# Patient Record
Sex: MALE | Race: WHITE | Employment: OTHER | URBAN - METROPOLITAN AREA
[De-identification: names, ages, dates, MRNs, and addresses within clinical notes are randomized per-mention and may not be internally consistent; named-entity substitution may affect disease eponyms.]

---

## 2017-04-04 ENCOUNTER — TELEPHONE (OUTPATIENT)
Dept: FAMILY MEDICINE CLINIC | Facility: CLINIC | Age: 67
End: 2017-04-04

## 2017-04-04 DIAGNOSIS — E78.2 MIXED HYPERLIPIDEMIA: Primary | ICD-10-CM

## 2017-04-04 RX ORDER — ZOLPIDEM TARTRATE 10 MG/1
10 TABLET ORAL NIGHTLY PRN
Qty: 90 TABLET | Refills: 0 | Status: SHIPPED
Start: 2017-04-04 | End: 2017-04-29

## 2017-04-29 ENCOUNTER — OFFICE VISIT (OUTPATIENT)
Dept: FAMILY MEDICINE CLINIC | Facility: CLINIC | Age: 67
End: 2017-04-29

## 2017-04-29 VITALS
OXYGEN SATURATION: 97 % | TEMPERATURE: 98 F | HEART RATE: 75 BPM | BODY MASS INDEX: 25.16 KG/M2 | DIASTOLIC BLOOD PRESSURE: 82 MMHG | WEIGHT: 166 LBS | SYSTOLIC BLOOD PRESSURE: 144 MMHG | HEIGHT: 68 IN

## 2017-04-29 DIAGNOSIS — Z00.00 ROUTINE MEDICAL EXAM: Primary | ICD-10-CM

## 2017-04-29 DIAGNOSIS — E78.2 MIXED HYPERLIPIDEMIA: ICD-10-CM

## 2017-04-29 DIAGNOSIS — G25.81 RESTLESS LEG SYNDROME: ICD-10-CM

## 2017-04-29 DIAGNOSIS — R05.9 COUGH: ICD-10-CM

## 2017-04-29 DIAGNOSIS — R42 DIZZINESS: ICD-10-CM

## 2017-04-29 DIAGNOSIS — R73.9 HYPERGLYCEMIA: ICD-10-CM

## 2017-04-29 DIAGNOSIS — I10 ESSENTIAL HYPERTENSION: ICD-10-CM

## 2017-04-29 DIAGNOSIS — G47.00 INSOMNIA, UNSPECIFIED TYPE: ICD-10-CM

## 2017-04-29 PROCEDURE — 99397 PER PM REEVAL EST PAT 65+ YR: CPT | Performed by: FAMILY MEDICINE

## 2017-04-29 PROCEDURE — 99213 OFFICE O/P EST LOW 20 MIN: CPT | Performed by: FAMILY MEDICINE

## 2017-04-29 RX ORDER — ALBUTEROL SULFATE 90 UG/1
2 AEROSOL, METERED RESPIRATORY (INHALATION) EVERY 6 HOURS PRN
COMMUNITY
End: 2017-05-26 | Stop reason: ALTCHOICE

## 2017-04-29 RX ORDER — AMLODIPINE BESYLATE 2.5 MG/1
2.5 TABLET ORAL DAILY
Qty: 90 TABLET | Refills: 0 | Status: SHIPPED | OUTPATIENT
Start: 2017-04-29 | End: 2017-05-26

## 2017-04-29 RX ORDER — ZOLPIDEM TARTRATE 10 MG/1
10 TABLET ORAL NIGHTLY PRN
Qty: 90 TABLET | Refills: 1 | Status: SHIPPED
Start: 2017-04-29 | End: 2017-09-13

## 2017-04-29 RX ORDER — CYCLOBENZAPRINE HCL 10 MG
10 TABLET ORAL NIGHTLY
Qty: 90 TABLET | Refills: 1 | Status: SHIPPED
Start: 2017-04-29 | End: 2017-05-02

## 2017-04-29 RX ORDER — CYCLOBENZAPRINE HCL 10 MG
10 TABLET ORAL NIGHTLY
COMMUNITY
End: 2017-04-29

## 2017-04-30 NOTE — H&P
HPI:   James Moise is a 77year old male who presents for a complete physical exam.    Patient presents with:  Medication Follow-Up  Physical  Hyperlipidemia  Hypertension  Insomnia  Cough  Dizziness    Patient has new complaints as below:  Recheck AM   K 4.5 06/24/2014 05:29 AM    06/24/2014 05:29 AM   CO2 26.0 06/24/2014 05:29 AM   CREATSERUM 0.86 06/24/2014 05:29 AM   CA 8.9 06/24/2014 05:29 AM   ALB 4.6 04/28/2010 08:41 AM   TP 6.6 04/28/2010 08:41 AM   ALKPHO 76 04/28/2010 08:41 AM   AST 2 1,000 mg by mouth daily.  Disp:  Rfl:       Past Medical History   Diagnosis Date   • HYPERLIPIDEMIA    • SEASONAL ALLERGIES    • OTHER DISEASES 1994, 2007, 2009     bell's palsy x3   • Other and unspecified hyperlipidemia    • High cholesterol    • Back pr palpitations  LUNG: Chronic cough, no SOB, or wheeze  GI:  No abdominal pain.   No N/V/D/C  :  No dysuria  MS:  No joint pain or swelling, leg pain intermittently, back pain improved  NEURO:  Denies numbness or tingling  PSYCH:  No mood concerns or anxiet indicates understanding of these issues and recommendations and agrees to the plan.     Additional Assessment:  (R73.9) Hyperglycemia  (primary encounter diagnosis), glucose 4/2017 was 100  Plan: HEMOGLOBIN A1C        Mild/persistent, with normal hemoglobin nightly. -     Zolpidem Tartrate 10 MG Oral Tab; Take 1 tablet (10 mg total) by mouth nightly as needed for Sleep. -     AmLODIPine Besylate 2.5 MG Oral Tab; Take 1 tablet (2.5 mg total) by mouth daily.            Orders Placed This Encounter  Hemoglobin

## 2017-05-02 ENCOUNTER — MED REC SCAN ONLY (OUTPATIENT)
Dept: FAMILY MEDICINE CLINIC | Facility: CLINIC | Age: 67
End: 2017-05-02

## 2017-05-02 ENCOUNTER — TELEPHONE (OUTPATIENT)
Dept: FAMILY MEDICINE CLINIC | Facility: CLINIC | Age: 67
End: 2017-05-02

## 2017-05-02 RX ORDER — CYCLOBENZAPRINE HCL 10 MG
10 TABLET ORAL NIGHTLY
Qty: 90 TABLET | Refills: 1 | Status: SHIPPED
Start: 2017-05-02 | End: 2017-05-26

## 2017-05-02 RX ORDER — CYCLOBENZAPRINE HCL 10 MG
10 TABLET ORAL NIGHTLY
Qty: 90 TABLET | Refills: 1 | Status: SHIPPED
Start: 2017-05-02 | End: 2017-05-02

## 2017-05-12 ENCOUNTER — TELEPHONE (OUTPATIENT)
Dept: FAMILY MEDICINE CLINIC | Facility: CLINIC | Age: 67
End: 2017-05-12

## 2017-05-12 NOTE — TELEPHONE ENCOUNTER
Adelfo Hamilton,    Below are the results of your recently performed labs/tests: All results are within NORMAL limits EXCEPT:     Your HgbA1c (diabetes test) is/are slightly elevated. Watch your diet-lower your starches.     Please recheck as below

## 2017-05-26 ENCOUNTER — OFFICE VISIT (OUTPATIENT)
Dept: FAMILY MEDICINE CLINIC | Facility: CLINIC | Age: 67
End: 2017-05-26

## 2017-05-26 VITALS
RESPIRATION RATE: 18 BRPM | OXYGEN SATURATION: 95 % | DIASTOLIC BLOOD PRESSURE: 84 MMHG | BODY MASS INDEX: 25.07 KG/M2 | SYSTOLIC BLOOD PRESSURE: 112 MMHG | HEIGHT: 68 IN | WEIGHT: 165.38 LBS | HEART RATE: 83 BPM

## 2017-05-26 DIAGNOSIS — R73.9 HYPERGLYCEMIA: ICD-10-CM

## 2017-05-26 DIAGNOSIS — I10 ESSENTIAL HYPERTENSION: Primary | ICD-10-CM

## 2017-05-26 DIAGNOSIS — E78.2 MIXED HYPERLIPIDEMIA: ICD-10-CM

## 2017-05-26 PROCEDURE — 99213 OFFICE O/P EST LOW 20 MIN: CPT | Performed by: FAMILY MEDICINE

## 2017-05-26 RX ORDER — AMLODIPINE BESYLATE 2.5 MG/1
2.5 TABLET ORAL DAILY
Qty: 90 TABLET | Refills: 0 | Status: SHIPPED | OUTPATIENT
Start: 2017-05-26 | End: 2017-06-28 | Stop reason: SINTOL

## 2017-05-26 RX ORDER — CYCLOBENZAPRINE HCL 10 MG
10 TABLET ORAL NIGHTLY
Qty: 90 TABLET | Refills: 1 | Status: SHIPPED
Start: 2017-05-26 | End: 2018-04-16

## 2017-05-26 RX ORDER — CYCLOBENZAPRINE HCL 10 MG
10 TABLET ORAL NIGHTLY
Qty: 90 TABLET | Refills: 1 | Status: SHIPPED
Start: 2017-05-26 | End: 2017-05-26

## 2017-05-26 RX ORDER — EPINASTINE HCL 0.05 %
DROPS OPHTHALMIC (EYE)
Refills: 2 | COMMUNITY
Start: 2017-03-28

## 2017-05-26 NOTE — PROGRESS NOTES
HPI:   Patient presents with: Follow - Up: Blood pressure and blood sugar  Medication Request  HTN      PatientElizabeth Fischer is a 77year old male who presents for recheck of his hypertension.     Home Blood Pressure monitoring within the normal range Rfl:      No current facility-administered medications on file prior to visit.    Past Medical History   Diagnosis Date   • HYPERLIPIDEMIA    • SEASONAL ALLERGIES    • OTHER DISEASES 1994, 2007, 2009     bell's palsy x3   • Other and unspecified hyperlipide 112/84 mmHg  Pulse 83  Resp 18  Ht 68\"  Wt 165 lb 6.4 oz  BMI 25.15 kg/m2  SpO2 95% Body mass index is 25.15 kg/(m^2).   Wt Readings from Last 3 Encounters:  05/26/17 : 165 lb 6.4 oz  04/29/17 : 166 lb  03/16/15 : 158 lb    BP Readings from Last 3 Encounte

## 2017-05-26 NOTE — PROGRESS NOTES
HPI:   Patient presents with: Follow - Up: Blood pressure and blood sugar  Medication Request  HTN      Patient: Oly Giraldo is a 77year old male who presents for recheck of his hypertension.     Home Blood Pressure monitoring within the normal ran facility-administered medications on file prior to visit.    Past Medical History   Diagnosis Date   • HYPERLIPIDEMIA    • SEASONAL ALLERGIES    • OTHER DISEASES 1994, 2007, 2009     bell's palsy x3   • Other and unspecified hyperlipidemia    • High cholest Resp 18  Ht 68\"  Wt 165 lb 6.4 oz  BMI 25.15 kg/m2  SpO2 95% Body mass index is 25.15 kg/(m^2).   Wt Readings from Last 3 Encounters:  05/26/17 : 165 lb 6.4 oz  04/29/17 : 166 lb  03/16/15 : 158 lb    BP Readings from Last 3 Encounters:  05/26/17 : Tc Dooley exercise (3 times a week for 30+ minutes each time)  Refer to Ophthalmology annually for routine eye exam  Observe for now, rechk CMP and Hgba1c in 6 mo, f/u P then     - Hemoglobin A1C (Glycohemoglobin) [E]; Future    3.  Mixed hyperlipidemia  Stable, CPM,

## 2017-05-31 ENCOUNTER — PATIENT MESSAGE (OUTPATIENT)
Dept: FAMILY MEDICINE CLINIC | Facility: CLINIC | Age: 67
End: 2017-05-31

## 2017-06-02 RX ORDER — LISINOPRIL 5 MG/1
5 TABLET ORAL DAILY
Qty: 90 TABLET | Refills: 0 | Status: SHIPPED | OUTPATIENT
Start: 2017-06-02 | End: 2017-06-26

## 2017-06-02 NOTE — TELEPHONE ENCOUNTER
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 ... the only thing that has changed is the new BP medi

## 2017-06-28 RX ORDER — LISINOPRIL 5 MG/1
5 TABLET ORAL DAILY
Qty: 90 TABLET | Refills: 0 | Status: SHIPPED
Start: 2017-06-28 | End: 2017-11-24

## 2017-08-07 ENCOUNTER — TELEPHONE (OUTPATIENT)
Dept: FAMILY MEDICINE CLINIC | Facility: CLINIC | Age: 67
End: 2017-08-07

## 2017-08-07 DIAGNOSIS — E78.2 MIXED HYPERLIPIDEMIA: Primary | ICD-10-CM

## 2017-08-07 DIAGNOSIS — R73.9 HYPERGLYCEMIA: ICD-10-CM

## 2017-08-19 ENCOUNTER — OFFICE VISIT (OUTPATIENT)
Dept: FAMILY MEDICINE CLINIC | Facility: CLINIC | Age: 67
End: 2017-08-19

## 2017-08-19 VITALS
HEART RATE: 76 BPM | WEIGHT: 160 LBS | BODY MASS INDEX: 25.11 KG/M2 | HEIGHT: 66.8 IN | SYSTOLIC BLOOD PRESSURE: 124 MMHG | OXYGEN SATURATION: 98 % | TEMPERATURE: 98 F | DIASTOLIC BLOOD PRESSURE: 76 MMHG

## 2017-08-19 DIAGNOSIS — G25.81 RESTLESS LEG: ICD-10-CM

## 2017-08-19 DIAGNOSIS — I10 ESSENTIAL HYPERTENSION: ICD-10-CM

## 2017-08-19 DIAGNOSIS — E78.2 MIXED HYPERLIPIDEMIA: ICD-10-CM

## 2017-08-19 DIAGNOSIS — Z00.00 ROUTINE MEDICAL EXAM: Primary | ICD-10-CM

## 2017-08-19 DIAGNOSIS — R73.9 HYPERGLYCEMIA: ICD-10-CM

## 2017-08-19 PROCEDURE — 99397 PER PM REEVAL EST PAT 65+ YR: CPT | Performed by: FAMILY MEDICINE

## 2017-08-19 RX ORDER — DIAZEPAM 2 MG/1
TABLET ORAL
Qty: 45 TABLET | Refills: 0 | Status: SHIPPED
Start: 2017-08-19 | End: 2018-05-02 | Stop reason: ALTCHOICE

## 2017-08-19 NOTE — H&P
HPI:   Patient presents with:  Physical      Tiffanie Phlegm is a 79year old male who presents for a complete physical exam.     Patient has new complaints of:  · No new concerns, does still complain of restless leg which is painful, actually Bilateral needed for Sleep. Disp: 90 tablet Rfl: 1   Atorvastatin Calcium (LIPITOR) 10 MG Oral Tab  Disp:  Rfl:    Cetirizine HCl (ZYRTEC OR) Take 10 mg by mouth daily. Disp:  Rfl:    Ascorbic Acid (VITAMIN C) 1000 MG Oral Tab Take 1,000 mg by mouth daily.  Disp:  Rf Comment: 1-2 glasses of wine per week    Drug use: No                  REVIEW OF SYSTEMS:   Pertinent positives and negatives noted in the the HPI.  Specifically:  GEN:  No fever or fatigue  HEAD:  No headaches  EYES:  No vision change  EARS:  No hearing lo calculated from the following:    Height as of this encounter: 66.8\". Weight as of this encounter: 160 lb. .  Discussed importance of exercise and healthy well balanced diet. Recommend low fat DASH diet and aerobic exercise 30 minutes 3-4 times weekly. Consults:  None    Marina Salazar MD

## 2017-08-19 NOTE — H&P
HPI:   Patient presents with:  Physical      Rodriguezmalik Dixon is a 79year old male who presents for a complete physical exam.     Patient has new complaints of:  · ***    He denies CP, SOB, Dizziness, HAs, Palpitations, Weight loss/gain, Rectal Bleeding by mouth daily. Disp:  Rfl:    Ascorbic Acid (VITAMIN C) 1000 MG Oral Tab Take 1,000 mg by mouth daily. Disp:  Rfl:    Calcium Carbonate (CALCIUM 600 OR) Take 600 mg by mouth daily. Disp:  Rfl:    L-Lysine 1000 MG Oral Tab Take 1,000 mg by mouth daily.  Dis Specifically:  GEN:  No fever or fatigue  HEAD:  No headaches  EYES:  No vision change  EARS:  No hearing loss  MOUTH/THROAT:  No sore throat or dental problems  HEART:  No chest pain or palpitations  LUNG:  No SOB, cough or wheeze  GI:  No abdominal pain. and healthy well balanced diet. Recommend low fat DASH diet and aerobic exercise 30 minutes 3-4 times weekly.     Health maintenance:  · Fasting Lipids, CMP, PSA and CBC annually: ***   · At 49 y/o: screening Colonoscopy: q 10 years if WNL or as recommended

## 2017-09-22 ENCOUNTER — TELEPHONE (OUTPATIENT)
Dept: FAMILY MEDICINE CLINIC | Facility: CLINIC | Age: 67
End: 2017-09-22

## 2017-09-22 NOTE — TELEPHONE ENCOUNTER
Received fax from  [employer] [address]    for prior authorization for Zolpidem 10 mg tablets.   Sig: Take 1 tablet PO QHS PRN for sleep  Qty: 90    Spoke to representative at [facility] prior authorization ([phone]), medication

## 2017-11-27 RX ORDER — LISINOPRIL 5 MG/1
5 TABLET ORAL DAILY
Qty: 90 TABLET | Refills: 0 | Status: SHIPPED | OUTPATIENT
Start: 2017-11-27 | End: 2018-02-24

## 2017-11-27 NOTE — TELEPHONE ENCOUNTER
[facility]  [address]  [place], [place]  Requests refill for:    Lisinopril 5 mg oral tab  Sig: Take 1 tablet PO QD  Qty: 90  RF: 0    LOV 8/19/17; f/p due 2/2018

## 2017-12-04 ENCOUNTER — TELEPHONE (OUTPATIENT)
Dept: FAMILY MEDICINE CLINIC | Facility: CLINIC | Age: 67
End: 2017-12-04

## 2017-12-04 NOTE — TELEPHONE ENCOUNTER
Returning call after pt called answering service. Pt unavailable; spoke to wife, states she believes pt spoke to a physician who gave recommendation of OTC medications. Wife states pt is feeling \"a little\" better, afebrile.  Was concerned due to leaving

## 2017-12-19 ENCOUNTER — TELEPHONE (OUTPATIENT)
Dept: FAMILY MEDICINE CLINIC | Facility: CLINIC | Age: 67
End: 2017-12-19

## 2017-12-19 RX ORDER — AZITHROMYCIN 250 MG/1
TABLET, FILM COATED ORAL
Qty: 6 TABLET | Refills: 0 | Status: SHIPPED | OUTPATIENT
Start: 2017-12-19 | End: 2017-12-23

## 2017-12-22 ENCOUNTER — TELEPHONE (OUTPATIENT)
Dept: FAMILY MEDICINE CLINIC | Facility: CLINIC | Age: 67
End: 2017-12-22

## 2017-12-22 ENCOUNTER — OFFICE VISIT (OUTPATIENT)
Dept: FAMILY MEDICINE CLINIC | Facility: CLINIC | Age: 67
End: 2017-12-22

## 2017-12-22 ENCOUNTER — HOSPITAL ENCOUNTER (OUTPATIENT)
Dept: GENERAL RADIOLOGY | Age: 67
Discharge: HOME | End: 2017-12-22
Attending: FAMILY MEDICINE
Payer: OTHER MISCELLANEOUS

## 2017-12-22 VITALS
OXYGEN SATURATION: 97 % | BODY MASS INDEX: 25.58 KG/M2 | HEIGHT: 66.8 IN | TEMPERATURE: 98 F | DIASTOLIC BLOOD PRESSURE: 80 MMHG | WEIGHT: 163 LBS | HEART RATE: 90 BPM | SYSTOLIC BLOOD PRESSURE: 134 MMHG

## 2017-12-22 DIAGNOSIS — R05.9 COUGH: Primary | ICD-10-CM

## 2017-12-22 DIAGNOSIS — M25.471 EDEMA OF RIGHT ANKLE: ICD-10-CM

## 2017-12-22 DIAGNOSIS — R05.9 COUGH: ICD-10-CM

## 2017-12-22 PROBLEM — S82.891A CLOSED FRACTURE OF RIGHT ANKLE: Status: ACTIVE | Noted: 2017-12-22

## 2017-12-22 PROCEDURE — 71020 XR CHEST PA + LAT CHEST (CPT=71020): CPT | Performed by: FAMILY MEDICINE

## 2017-12-22 PROCEDURE — 73610 X-RAY EXAM OF ANKLE: CPT | Performed by: FAMILY MEDICINE

## 2017-12-22 PROCEDURE — 99214 OFFICE O/P EST MOD 30 MIN: CPT | Performed by: FAMILY MEDICINE

## 2017-12-22 RX ORDER — METHYLPREDNISOLONE 4 MG/1
TABLET ORAL
Qty: 1 KIT | Refills: 0 | Status: SHIPPED | OUTPATIENT
Start: 2017-12-22 | End: 2018-05-02 | Stop reason: ALTCHOICE

## 2017-12-22 RX ORDER — ALBUTEROL SULFATE 90 UG/1
2 AEROSOL, METERED RESPIRATORY (INHALATION) EVERY 4 HOURS PRN
Qty: 1 INHALER | Refills: 2 | Status: SHIPPED | OUTPATIENT
Start: 2017-12-22 | End: 2018-03-05

## 2017-12-22 NOTE — TELEPHONE ENCOUNTER
Pt calling, seen by [facility]; R ankle fracture. Pt told he would need a temporary cast; [facility] not able to provide removable boots. Spoke to pt, states [facility] to have [facility] cast [facility] on 11/23/17.  Pt instructed t

## 2017-12-22 NOTE — TELEPHONE ENCOUNTER
Rocio Cox called,still not better,persistent cough,chills on and off, lots of congestion. The rx she called in the other day doesn't seem to be working. Informed him she is booked today and I will sent a message to doctor.

## 2017-12-22 NOTE — PROGRESS NOTES
HPI: 79year old male presents for f/u on cough. Pt. started a Zpak 3 days ago however it does not seem to be helping. Cough is persistent x 3.5 weeks. Cough is mostly dry, sometimes hacks up phlegm-clear to white in color. (+) assoc congestion, PND.  [place] nontender. Bowel sounds normal. No masses, No hepatosplenomegaly. No flank pain. No rebound/rigidity. Musc: ROM intact spine and extremities. No joint erythema or tenderness. Normal muscular development. Normal gait.   Extremities: Extremities without cyan

## 2017-12-22 NOTE — TELEPHONE ENCOUNTER
Spoke with patient-discussed fracture result on ankle Xray. Advised to go to IC in [facility] for a boot. Ortho Surg referral entered to Dr. Alicia Bailey. Also discussed neg CXR-to take MDP & Alb INH, stop Zpak. F/u prn.

## 2017-12-22 NOTE — PATIENT INSTRUCTIONS
PLAN:  -CXR  -steroid pack (no NSAIDs such as Ibuprofen while taking)  -Alb INH as needed  -continue Zpak    -rest, elevate, ice, brace R ankle  -steroid pack as above   -Xray R ankle    -f/u as needed

## 2017-12-23 ENCOUNTER — HOSPITAL ENCOUNTER (OUTPATIENT)
Age: 67
Discharge: HOME | End: 2017-12-23
Payer: OTHER MISCELLANEOUS

## 2017-12-23 VITALS
HEART RATE: 88 BPM | RESPIRATION RATE: 20 BRPM | BODY MASS INDEX: 24.55 KG/M2 | WEIGHT: 162 LBS | TEMPERATURE: 98 F | OXYGEN SATURATION: 98 % | DIASTOLIC BLOOD PRESSURE: 94 MMHG | SYSTOLIC BLOOD PRESSURE: 163 MMHG | HEIGHT: 68 IN

## 2017-12-23 DIAGNOSIS — S92.024A CLOSED NONDISPLACED FRACTURE OF ANTERIOR PROCESS OF RIGHT CALCANEUS, INITIAL ENCOUNTER: Primary | ICD-10-CM

## 2017-12-23 PROCEDURE — 29515 APPLICATION SHORT LEG SPLINT: CPT

## 2017-12-23 PROCEDURE — 99203 OFFICE O/P NEW LOW 30 MIN: CPT

## 2017-12-23 PROCEDURE — 99202 OFFICE O/P NEW SF 15 MIN: CPT

## 2017-12-23 NOTE — ED INITIAL ASSESSMENT (HPI)
Pt. States he was seen at his doctor yesterday, had an outpt. Xray done for Rt. Ankle injury. Injury occurred during work on 12/20/17 450p. Tripped on a barrier at the [facility] airport. Rt. Ankle twisted. Has had bruising & swelling.  Has iced the area on & o

## 2017-12-23 NOTE — ED PROVIDER NOTES
Patient Seen in: [facility]    History   Patient presents with: Ankle Injury: Rt.   Laceration Abrasion (integumentary): Lt. knee    Stated Complaint: Vedia Pauline COMP/RIGHT ANKLE INJURY    HPI    Olena Ontiveros is a 80-year-old male who pre Comment: Repair right meniscus tear  No date: OTHER SURGICAL HISTORY      Comment: adnoids removed x 2  1963: OTHER SURGICAL HISTORY      Comment: left leg surgical repair due to horse kick   No date: TONSILLECTOMY    Family history reviewed and is not ED Course as of Dec 23 1042  ------------------------------------------------------------  Xr Chest Pa + Lat Chest (hfp=04372)    Result Date: 12/22/2017  PROCEDURE: XR CHEST PA + LAT CHEST (CPT=71020)  COMPARISON: None. INDICATIONS: Cough x 3 weeks.   Int A short leg fiberglass postmold was placed on the patient that is secured with ace wraps. The affected area and the joints proximal and distal to the area are properly immobilized. The patient tolerates the placement well.   I assess the patient after wilberto [facility] [place] [address]  [phone]  In 3 days          Medications Prescribed:  Current Discharge Medication List

## 2017-12-26 ENCOUNTER — TELEPHONE (OUTPATIENT)
Dept: FAMILY MEDICINE CLINIC | Facility: CLINIC | Age: 67
End: 2017-12-26

## 2017-12-26 PROBLEM — S92.001A: Status: ACTIVE | Noted: 2017-12-26

## 2017-12-26 NOTE — TELEPHONE ENCOUNTER
Pt Norman Kohler called saw  last week, has a swollen lymph node left side under chin. He is not sure what to do and wanted to make doc aware.

## 2017-12-26 NOTE — TELEPHONE ENCOUNTER
Very normal with any kind of sickness. They can stay enlarged for several weeks to months after being sick. Recommend watchful waiting as long as no other new s/s?

## 2018-01-29 NOTE — TELEPHONE ENCOUNTER
From: Maggie Apley  Sent: 1/28/2018 8:23 PM CST  Subject: Medication Renewal Request    Carly Bird.  Ian Xiao would like a refill of the following medications:     Zolpidem Tartrate 10 MG Oral Tab Esther Mann MD]   Patient Comment: please send

## 2018-01-30 RX ORDER — ZOLPIDEM TARTRATE 10 MG/1
10 TABLET ORAL NIGHTLY PRN
Qty: 90 TABLET | Refills: 0 | Status: SHIPPED
Start: 2018-01-30 | End: 2018-04-16

## 2018-02-26 RX ORDER — LISINOPRIL 5 MG/1
5 TABLET ORAL DAILY
Qty: 90 TABLET | Refills: 0 | Status: SHIPPED | OUTPATIENT
Start: 2018-02-26 | End: 2018-05-16

## 2018-02-28 ENCOUNTER — PATIENT MESSAGE (OUTPATIENT)
Dept: FAMILY MEDICINE CLINIC | Facility: CLINIC | Age: 68
End: 2018-02-28

## 2018-02-28 DIAGNOSIS — Z47.89 ORTHOPEDIC AFTERCARE: ICD-10-CM

## 2018-02-28 RX ORDER — ATORVASTATIN CALCIUM 10 MG/1
10 TABLET, FILM COATED ORAL NIGHTLY
Qty: 90 TABLET | Refills: 0 | Status: SHIPPED | OUTPATIENT
Start: 2018-02-28 | End: 2018-04-17

## 2018-02-28 NOTE — TELEPHONE ENCOUNTER
From: Mario Lang  To: Zechariah Salcedo MD  Sent: 2/28/2018 7:37 AM CST  Subject: Prescription Question    Would you please send a new RX for ATORVASTATIN 10 mg, 90 pills, to [facility] in [place].   Vonnie Rosenthal takes 5 mg of these - cutting the 10 in

## 2018-03-05 RX ORDER — ALBUTEROL SULFATE 90 UG/1
2 AEROSOL, METERED RESPIRATORY (INHALATION) EVERY 4 HOURS PRN
Qty: 1 INHALER | Refills: 2 | Status: SHIPPED
Start: 2018-03-05 | End: 2018-04-23

## 2018-03-05 NOTE — TELEPHONE ENCOUNTER
From: Miguel Ángel Barroso  Sent: 3/5/2018 4:09 PM CST  Subject: Medication Renewal Request    Mane Alfonso.  Laura Whalen would like a refill of the following medications:     Albuterol Sulfate  (90 Base) MCG/ACT Inhalation Aero Soln Rashi Florence, DORamesh   Pa

## 2018-03-05 NOTE — TELEPHONE ENCOUNTER
See pt's [employer] message. Pt requesting refill for:    Albuterol Sulfate 108 mcg/ACT inhalation solution  Sig: Inhale 2 puffs into the lungs Q4H PRN wheezing  Qty: 1 inhaler  RF 2    [facility]  [address]  [address] [facility] 12/22/

## 2018-03-11 LAB
ALBUMIN/GLOBULIN RATIO: 2 (CALC) (ref 1–2.5)
ALBUMIN: 4.5 G/DL (ref 3.6–5.1)
ALKALINE PHOSPHATASE: 72 U/L (ref 40–115)
ALT: 19 U/L (ref 9–46)
AST: 22 U/L (ref 10–35)
BILIRUBIN, TOTAL: 0.8 MG/DL (ref 0.2–1.2)
BUN: 25 MG/DL (ref 7–25)
CALCIUM: 9.7 MG/DL (ref 8.6–10.3)
CARBON DIOXIDE: 25 MMOL/L (ref 20–31)
CHLORIDE: 107 MMOL/L (ref 98–110)
CHOL/HDLC RATIO: 3.8 (CALC)
CHOLESTEROL, TOTAL: 157 MG/DL
CREATININE: 1.12 MG/DL (ref 0.7–1.25)
EGFR IF AFRICN AM: 78 ML/MIN/1.73M2
EGFR IF NONAFRICN AM: 68 ML/MIN/1.73M2
GLOBULIN: 2.2 G/DL (CALC) (ref 1.9–3.7)
GLUCOSE: 102 MG/DL (ref 65–99)
HDL CHOLESTEROL: 41 MG/DL
HEMOGLOBIN A1C: 5.4 % OF TOTAL HGB
LDL-CHOLESTEROL: 99 MG/DL (CALC)
NON-HDL CHOLESTEROL: 116 MG/DL (CALC)
POTASSIUM: 5.5 MMOL/L (ref 3.5–5.3)
PROTEIN, TOTAL: 6.7 G/DL (ref 6.1–8.1)
SODIUM: 140 MMOL/L (ref 135–146)
TRIGLYCERIDES: 80 MG/DL

## 2018-04-16 NOTE — TELEPHONE ENCOUNTER
Pt requesting refills for:    Cyclobenzaprine HCL 10 mg oral tabs  Sig: Take 1 tablet PO nightly  Qty: 90  LRF 5/26/17    Zolpidem Tartrate 10 mg oral tab  Sig: Take 1 tablet PO nightly PRN sleep  Qty: 90  1/30/18        LOV 4/29/17 insomnia; 8/19/17 CPE,

## 2018-04-16 NOTE — TELEPHONE ENCOUNTER
From: Charlie Mack  Sent: 4/16/2018 6:36 AM CDT  Subject: Medication Renewal Request    Keisha Avila.  Jeny Velazquez would like a refill of the following medications:     Cyclobenzaprine HCl 10 MG Oral Tab Josue Mari MD]   Patient Comment: Please se

## 2018-04-16 NOTE — TELEPHONE ENCOUNTER
Cld pt spoke with his wife. He is retiring in two weeks so will then have time to come in and see doctor. Wife said he needs the two meds refilled before he can come in. Wife said they will cb and make an appt w/doc.

## 2018-04-17 DIAGNOSIS — Z47.89 ORTHOPEDIC AFTERCARE: ICD-10-CM

## 2018-04-17 RX ORDER — CYCLOBENZAPRINE HCL 10 MG
10 TABLET ORAL NIGHTLY
Qty: 90 TABLET | Refills: 1 | Status: SHIPPED
Start: 2018-04-17 | End: 2018-04-26

## 2018-04-17 RX ORDER — ATORVASTATIN CALCIUM 10 MG/1
10 TABLET, FILM COATED ORAL NIGHTLY
Qty: 90 TABLET | Refills: 0 | Status: SHIPPED | OUTPATIENT
Start: 2018-04-17 | End: 2018-06-23

## 2018-04-17 RX ORDER — ZOLPIDEM TARTRATE 10 MG/1
10 TABLET ORAL NIGHTLY PRN
Qty: 90 TABLET | Refills: 0 | Status: SHIPPED
Start: 2018-04-17

## 2018-04-28 RX ORDER — CYCLOBENZAPRINE HCL 10 MG
TABLET ORAL
Qty: 90 TABLET | Refills: 1 | Status: SHIPPED
Start: 2018-04-28

## 2018-05-02 ENCOUNTER — OFFICE VISIT (OUTPATIENT)
Dept: FAMILY MEDICINE CLINIC | Facility: CLINIC | Age: 68
End: 2018-05-02

## 2018-05-02 VITALS
BODY MASS INDEX: 24.64 KG/M2 | OXYGEN SATURATION: 98 % | HEART RATE: 70 BPM | WEIGHT: 157 LBS | HEIGHT: 66.8 IN | TEMPERATURE: 99 F | SYSTOLIC BLOOD PRESSURE: 136 MMHG | DIASTOLIC BLOOD PRESSURE: 74 MMHG

## 2018-05-02 DIAGNOSIS — I10 ESSENTIAL HYPERTENSION: ICD-10-CM

## 2018-05-02 DIAGNOSIS — E78.2 MIXED HYPERLIPIDEMIA: Primary | ICD-10-CM

## 2018-05-02 DIAGNOSIS — G47.00 INSOMNIA, UNSPECIFIED TYPE: ICD-10-CM

## 2018-05-02 DIAGNOSIS — G25.81 RESTLESS LEG SYNDROME: ICD-10-CM

## 2018-05-02 DIAGNOSIS — R73.9 HYPERGLYCEMIA: ICD-10-CM

## 2018-05-02 PROCEDURE — 99214 OFFICE O/P EST MOD 30 MIN: CPT | Performed by: FAMILY MEDICINE

## 2018-05-02 NOTE — PROGRESS NOTES
HPI:   Patient presents with:  Hyperlipidemia  Hypertension  Insomnia  Musculoskeletal Problem      Patient Jesusita Raymundo is a 79year old male who presents for recheck of his hypertension.     Home Blood Pressure monitoring within the normal range   C CO2 25 03/10/2018 09:29 AM   CREATSERUM 1.12 03/10/2018 09:29 AM   CA 9.7 03/10/2018 09:29 AM   ALB 4.5 03/10/2018 09:29 AM   TP 6.7 03/10/2018 09:29 AM   ALKPHO 72 03/10/2018 09:29 AM   AST 22 03/10/2018 09:29 AM   ALT 19 03/10/2018 09:29 AM   BILT 0.8 BLAIR BERMUDEZ N/A      Comment: Procedure: COLONOSCOPY, POSSIBLE BIOPSY,                POSSIBLE POLYPECTOMY [id number];  Surgeon: Haroon Mack MD;  Location: [address] [place] [address]  2011: OTHER Right      Comment: Repair right meniscus tear color overall wnl  HEENT: atraumatic, normocephalic, nose clear; throat clear; dentition good, EARS: canals clear, TM wnl B  EYES: PERRLA, EOMI, conjunctiva clear, no discharge B  NECK: supple, no LAD, no TM, no carotid bruits or JVD bilaterally  LUNGS: go encounter    Imaging & Consults:  None    Anam Murrell MD

## 2018-05-16 RX ORDER — LISINOPRIL 5 MG/1
5 TABLET ORAL DAILY
Qty: 90 TABLET | Refills: 0 | Status: SHIPPED | OUTPATIENT
Start: 2018-05-16

## 2018-05-16 NOTE — TELEPHONE ENCOUNTER
Medication request passes protocol - rx refilled.   Lisinopril 5 mg; CMP/BMP in past 15 m, last CR <2, apt w/in last 6 m  Last refill 2/26/18  LOV 5/2/18

## 2018-06-04 ENCOUNTER — OFFICE VISIT (OUTPATIENT)
Dept: FAMILY MEDICINE CLINIC | Facility: CLINIC | Age: 68
End: 2018-06-04

## 2018-06-04 ENCOUNTER — TELEPHONE (OUTPATIENT)
Dept: FAMILY MEDICINE CLINIC | Facility: CLINIC | Age: 68
End: 2018-06-04

## 2018-06-04 VITALS
WEIGHT: 165 LBS | RESPIRATION RATE: 17 BRPM | SYSTOLIC BLOOD PRESSURE: 132 MMHG | TEMPERATURE: 98 F | OXYGEN SATURATION: 97 % | BODY MASS INDEX: 25.01 KG/M2 | HEART RATE: 89 BPM | DIASTOLIC BLOOD PRESSURE: 82 MMHG | HEIGHT: 68 IN

## 2018-06-04 DIAGNOSIS — J30.89 ALLERGIC RHINITIS DUE TO OTHER ALLERGIC TRIGGER, UNSPECIFIED SEASONALITY: ICD-10-CM

## 2018-06-04 DIAGNOSIS — E04.9 ENLARGED THYROID GLAND: ICD-10-CM

## 2018-06-04 DIAGNOSIS — R07.0 THROAT DISCOMFORT: ICD-10-CM

## 2018-06-04 DIAGNOSIS — R13.10 PAINFUL SWALLOWING: Primary | ICD-10-CM

## 2018-06-04 PROCEDURE — 99214 OFFICE O/P EST MOD 30 MIN: CPT | Performed by: FAMILY MEDICINE

## 2018-06-04 PROCEDURE — 87081 CULTURE SCREEN ONLY: CPT | Performed by: FAMILY MEDICINE

## 2018-06-04 PROCEDURE — 87880 STREP A ASSAY W/OPTIC: CPT | Performed by: FAMILY MEDICINE

## 2018-06-04 RX ORDER — FLUTICASONE PROPIONATE 50 MCG
2 SPRAY, SUSPENSION (ML) NASAL DAILY
Qty: 3 BOTTLE | Refills: 3 | COMMUNITY
Start: 2018-06-04 | End: 2019-05-30

## 2018-06-04 RX ORDER — MONTELUKAST SODIUM 10 MG/1
10 TABLET ORAL NIGHTLY
Qty: 30 TABLET | Refills: 1 | Status: SHIPPED | OUTPATIENT
Start: 2018-06-04

## 2018-06-04 NOTE — PATIENT INSTRUCTIONS
PLAN:  -strep negative    -continue Flonase, Zyrtec  -add Singulair nightly  -consider seeing an Allergist if allergy symptoms continue despite above    -check thyroid function tests  -thyroid ultrasound ordered    -swallow study ordered    -f/u after abov

## 2018-06-04 NOTE — TELEPHONE ENCOUNTER
Spoke to pt, states he has a ST; pain with swallowing, PND, lump on throat. Denies fever. Pt offered appt today, accepted.  Pt will come in today at 2pm.

## 2018-06-04 NOTE — PROGRESS NOTES
HPI: 76year old male presents c/o \"discomfort\" when swallowing x 3-4 days. VSS. No difficulty passing fluids and solids. Denies fevers/chills/sore throat/cough/CP/SOB/wheezing/heartburn/abd pain/N/V. Never had a sensation like this before.      C/o PND, development. Normal gait. Extremities: Extremities normal, atraumatic, no cyanosis or edema. Skin: Skin color, texture, turgor normal. No rashes or lesions.   Lymph nodes: Cervical, supraclavicular nodes normal.    Over 50% of this 25 minute visit was sp

## 2018-06-23 ENCOUNTER — PATIENT MESSAGE (OUTPATIENT)
Dept: FAMILY MEDICINE CLINIC | Facility: CLINIC | Age: 68
End: 2018-06-23

## 2018-06-23 ENCOUNTER — TELEPHONE (OUTPATIENT)
Dept: FAMILY MEDICINE CLINIC | Facility: CLINIC | Age: 68
End: 2018-06-23

## 2018-06-23 DIAGNOSIS — Z47.89 ORTHOPEDIC AFTERCARE: ICD-10-CM

## 2018-06-23 RX ORDER — ATORVASTATIN CALCIUM 10 MG/1
10 TABLET, FILM COATED ORAL NIGHTLY
Qty: 90 TABLET | Refills: 0 | Status: SHIPPED | OUTPATIENT
Start: 2018-06-23

## 2018-06-23 NOTE — TELEPHONE ENCOUNTER
Pt requested refill for Lipitor 10mg . Plz fax [facility] to pharmacy [facility] [place] . [facility] [phone] fax [phone]. Pt requested asap

## 2018-07-20 ENCOUNTER — TELEPHONE (OUTPATIENT)
Dept: FAMILY MEDICINE CLINIC | Facility: CLINIC | Age: 68
End: 2018-07-20

## 2018-08-04 DIAGNOSIS — Z47.89 ORTHOPEDIC AFTERCARE: ICD-10-CM

## 2018-08-06 RX ORDER — ATORVASTATIN CALCIUM 10 MG/1
10 TABLET, FILM COATED ORAL NIGHTLY
Qty: 90 TABLET | Refills: 0 | Status: SHIPPED | OUTPATIENT
Start: 2018-08-06

## 2018-08-06 NOTE — TELEPHONE ENCOUNTER
Rx passes protocol - rx refilled.   Cholesterol; ALT < 80, ALT in past y, lipid in past y, apt in past 15 m

## 2019-12-23 DIAGNOSIS — Z47.89 ORTHOPEDIC AFTERCARE: ICD-10-CM

## 2019-12-23 RX ORDER — ATORVASTATIN CALCIUM 10 MG/1
TABLET, FILM COATED ORAL
Qty: 90 TABLET | Refills: 0 | OUTPATIENT
Start: 2019-12-23

## (undated) NOTE — LETTER
[facility] IN [place]  [address] [address]  Dept: [phone]  Dept Fax: [phone]      December 23, 2017    Patient: Whit Guerrero   Date of Visit: 12/23/2017       To Whom It May Concern:    Gabby Villegas was seen

## (undated) NOTE — MR AVS SNAPSHOT
[address] at [address], [address]  [address] [place] [address]  [phone]               Thank you for choosing us for your health care visit with Tonya Huffman MD.  We are glad to serve you and happy to pr taking this medication, and follow the directions you see here. Commonly known as:  LIPITOR           CALCIUM 600 OR   Take 600 mg by mouth daily. Cyclobenzaprine HCl 10 MG Tabs   Take 1 tablet (10 mg total) by mouth nightly.    Commonly known a Please consider the following Lifestyle Modifications. Also, please return for a follow-up Blood Pressure Check in 1 year.      Lifestyle Modification Recommendations:    Modification Recommendation   Weight Reduction Maintain normal body weight (body mass

## (undated) NOTE — MR AVS SNAPSHOT
[address] at [address], [address]  [address] [place] [address]  [phone]               Thank you for choosing us for your health care visit with Karson Mauricio MD.  We are glad to serve you and happy to pr Hyperlipidemia     Hypertension     Insomnia     Cough     Dizziness           Medical Issues Discussed Today     Restless leg syndrome    Routine medical exam    -  Primary    Hyperglycemia        Essential hypertension        Mixed hyperlipidemia Take 1 tablet (10 mg total) by mouth nightly as needed for Sleep. Commonly known as:  AMBIEN           ZYRTEC OR   Take 10 mg by mouth daily. * Notice: This list has 4 medication(s) that are the same as other medications prescribed for you.  Re DASH eating plan Adopt a diet rich in fruits, vegetables, and low fat dairy products with reduced content of saturated and total fat.    Dietary sodium reduction Reduce dietary sodium intake to <= 100 mmol per day (2.4 g sodium or 6 g sodium chloride)   Aer including white bread, rice and pasta   Eat plenty of protein, keep the fat content low Sugars:  sodas and sports drinks, candies and desserts   Eat plenty of low-fat dairy products High fat meats and dairy   Choose whole grain products Foods high in sodiu